# Patient Record
Sex: MALE | URBAN - METROPOLITAN AREA
[De-identification: names, ages, dates, MRNs, and addresses within clinical notes are randomized per-mention and may not be internally consistent; named-entity substitution may affect disease eponyms.]

---

## 2019-07-05 ENCOUNTER — APPOINTMENT (RX ONLY)
Dept: URBAN - METROPOLITAN AREA TELEMEDICINE 2 | Facility: TELEMEDICINE | Age: 44
Setting detail: DERMATOLOGY
End: 2019-07-05

## 2019-07-05 DIAGNOSIS — Z87.891 PERSONAL HISTORY OF NICOTINE DEPENDENCE: ICD-10-CM

## 2019-07-05 DIAGNOSIS — R60.0 LOCALIZED EDEMA: ICD-10-CM

## 2019-07-05 DIAGNOSIS — L08.9 LOCAL INFECTION OF THE SKIN AND SUBCUTANEOUS TISSUE, UNSPECIFIED: ICD-10-CM

## 2019-07-05 DIAGNOSIS — B35.4 TINEA CORPORIS: ICD-10-CM

## 2019-07-05 PROBLEM — F41.9 ANXIETY DISORDER, UNSPECIFIED: Status: ACTIVE | Noted: 2019-07-05

## 2019-07-05 PROBLEM — J45.909 UNSPECIFIED ASTHMA, UNCOMPLICATED: Status: ACTIVE | Noted: 2019-07-05

## 2019-07-05 PROBLEM — F32.9 MAJOR DEPRESSIVE DISORDER, SINGLE EPISODE, UNSPECIFIED: Status: ACTIVE | Noted: 2019-07-05

## 2019-07-05 PROBLEM — E13.9 OTHER SPECIFIED DIABETES MELLITUS WITHOUT COMPLICATIONS: Status: ACTIVE | Noted: 2019-07-05

## 2019-07-05 PROBLEM — G40.909 EPILEPSY, UNSPECIFIED, NOT INTRACTABLE, WITHOUT STATUS EPILEPTICUS: Status: ACTIVE | Noted: 2019-07-05

## 2019-07-05 PROCEDURE — ? PRESCRIPTION

## 2019-07-05 PROCEDURE — ? KOH PREP

## 2019-07-05 PROCEDURE — 87220 TISSUE EXAM FOR FUNGI: CPT

## 2019-07-05 PROCEDURE — 99202 OFFICE O/P NEW SF 15 MIN: CPT

## 2019-07-05 PROCEDURE — ? PATIENT SPECIFIC COUNSELING

## 2019-07-05 PROCEDURE — ? ORDER TESTS

## 2019-07-05 PROCEDURE — ? COUNSELING

## 2019-07-05 RX ORDER — TERBINAFINE HYDROCHLORIDE 250 MG/1
TABLET ORAL
Qty: 14 | Refills: 0 | Status: ERX | COMMUNITY
Start: 2019-07-05

## 2019-07-05 RX ORDER — DOXYCYCLINE HYCLATE 100 MG/1
CAPSULE, GELATIN COATED ORAL BID
Qty: 20 | Refills: 0 | Status: ERX | COMMUNITY
Start: 2019-07-05

## 2019-07-05 RX ORDER — MUPIROCIN 20 MG/G
1 OINTMENT TOPICAL TWICE DAILY
Qty: 2 | Refills: 1 | Status: ERX | COMMUNITY
Start: 2019-07-05

## 2019-07-05 RX ORDER — CICLOPIROX OLAMINE 7.7 MG/G
1 CREAM TOPICAL TWICE DAILY
Qty: 1 | Refills: 1 | Status: ERX | COMMUNITY
Start: 2019-07-05

## 2019-07-05 RX ADMIN — CICLOPIROX OLAMINE 1: 7.7 CREAM TOPICAL at 14:23

## 2019-07-05 RX ADMIN — TERBINAFINE HYDROCHLORIDE: 250 TABLET ORAL at 14:26

## 2019-07-05 RX ADMIN — MUPIROCIN 1: 20 OINTMENT TOPICAL at 14:23

## 2019-07-05 RX ADMIN — DOXYCYCLINE HYCLATE: 100 CAPSULE, GELATIN COATED ORAL at 14:22

## 2019-07-05 ASSESSMENT — LOCATION DETAILED DESCRIPTION DERM: LOCATION DETAILED: RIGHT DISTAL PRETIBIAL REGION

## 2019-07-05 ASSESSMENT — LOCATION ZONE DERM: LOCATION ZONE: LEG

## 2019-07-05 ASSESSMENT — LOCATION SIMPLE DESCRIPTION DERM: LOCATION SIMPLE: RIGHT PRETIBIAL REGION

## 2019-07-05 NOTE — HPI: RASH
What Type Of Note Output Would You Prefer (Optional)?: Bullet Format
How Severe Is Your Rash?: moderate
Is This A New Presentation, Or A Follow-Up?: Rash
Additional History: Patient states he has been given multiple ointments in the past.

## 2019-07-12 ENCOUNTER — APPOINTMENT (RX ONLY)
Dept: URBAN - METROPOLITAN AREA TELEMEDICINE 2 | Facility: TELEMEDICINE | Age: 44
Setting detail: DERMATOLOGY
End: 2019-07-12

## 2019-07-12 DIAGNOSIS — B35.4 TINEA CORPORIS: ICD-10-CM | Status: IMPROVED

## 2019-07-12 DIAGNOSIS — L81.0 POSTINFLAMMATORY HYPERPIGMENTATION: ICD-10-CM

## 2019-07-12 DIAGNOSIS — L08.9 LOCAL INFECTION OF THE SKIN AND SUBCUTANEOUS TISSUE, UNSPECIFIED: ICD-10-CM | Status: RESOLVING

## 2019-07-12 PROCEDURE — ? PATIENT SPECIFIC COUNSELING

## 2019-07-12 PROCEDURE — ? PRESCRIPTION

## 2019-07-12 PROCEDURE — ? TREATMENT REGIMEN

## 2019-07-12 PROCEDURE — 99213 OFFICE O/P EST LOW 20 MIN: CPT

## 2019-07-12 PROCEDURE — ? COUNSELING

## 2019-07-12 RX ORDER — TERBINAFINE HYDROCHLORIDE 250 MG/1
TABLET ORAL QD
Qty: 14 | Refills: 0 | Status: ERX | COMMUNITY
Start: 2019-07-12

## 2019-07-12 RX ADMIN — TERBINAFINE HYDROCHLORIDE: 250 TABLET ORAL at 14:08

## 2019-07-12 ASSESSMENT — LOCATION DETAILED DESCRIPTION DERM
LOCATION DETAILED: RIGHT DISTAL CALF
LOCATION DETAILED: RIGHT DISTAL PRETIBIAL REGION

## 2019-07-12 ASSESSMENT — LOCATION SIMPLE DESCRIPTION DERM
LOCATION SIMPLE: RIGHT CALF
LOCATION SIMPLE: RIGHT PRETIBIAL REGION

## 2019-07-12 ASSESSMENT — LOCATION ZONE DERM: LOCATION ZONE: LEG

## 2019-07-12 ASSESSMENT — SEVERITY ASSESSMENT: SEVERITY: MODERATE

## 2019-07-12 NOTE — PROCEDURE: TREATMENT REGIMEN
Detail Level: Zone
Continue Regimen: Terbinafine for two weeks qd\\nCiclopirox
Plan: Pt has a few days left on doxycycline, pt is to discontinue doxycycline after his pills are gone
Discontinue Regimen: Doxycycline
Continue Regimen: Mupirocin

## 2019-07-26 ENCOUNTER — APPOINTMENT (RX ONLY)
Dept: URBAN - METROPOLITAN AREA TELEMEDICINE 2 | Facility: TELEMEDICINE | Age: 44
Setting detail: DERMATOLOGY
End: 2019-07-26

## 2019-07-26 DIAGNOSIS — B35.4 TINEA CORPORIS: ICD-10-CM

## 2019-07-26 DIAGNOSIS — L08.9 LOCAL INFECTION OF THE SKIN AND SUBCUTANEOUS TISSUE, UNSPECIFIED: ICD-10-CM

## 2019-07-26 DIAGNOSIS — L81.8 OTHER SPECIFIED DISORDERS OF PIGMENTATION: ICD-10-CM

## 2019-07-26 PROCEDURE — 99213 OFFICE O/P EST LOW 20 MIN: CPT | Mod: 25

## 2019-07-26 PROCEDURE — ? BIOPSY BY PUNCH METHOD

## 2019-07-26 PROCEDURE — ? COUNSELING

## 2019-07-26 PROCEDURE — 11104 PUNCH BX SKIN SINGLE LESION: CPT

## 2019-07-26 PROCEDURE — ? OTHER

## 2019-07-26 PROCEDURE — ? PRESCRIPTION

## 2019-07-26 PROCEDURE — ? ORDER TESTS

## 2019-07-26 RX ORDER — TRIAMCINOLONE ACETONIDE 1 MG/G
1 CREAM TOPICAL BID
Qty: 80 | Refills: 0 | Status: ERX | COMMUNITY
Start: 2019-07-26

## 2019-07-26 RX ORDER — CICLOPIROX OLAMINE 7.7 MG/G
1 CREAM TOPICAL TWICE DAILY
Qty: 1 | Refills: 1 | Status: ERX | COMMUNITY
Start: 2019-07-26

## 2019-07-26 RX ADMIN — CICLOPIROX OLAMINE 1: 7.7 CREAM TOPICAL at 14:27

## 2019-07-26 RX ADMIN — TRIAMCINOLONE ACETONIDE 1: 1 CREAM TOPICAL at 14:29

## 2019-07-26 ASSESSMENT — LOCATION DETAILED DESCRIPTION DERM
LOCATION DETAILED: RIGHT MEDIAL DISTAL PRETIBIAL REGION
LOCATION DETAILED: RIGHT DISTAL PRETIBIAL REGION
LOCATION DETAILED: RIGHT DISTAL CALF

## 2019-07-26 ASSESSMENT — LOCATION SIMPLE DESCRIPTION DERM
LOCATION SIMPLE: RIGHT PRETIBIAL REGION
LOCATION SIMPLE: RIGHT CALF

## 2019-07-26 ASSESSMENT — SEVERITY ASSESSMENT: SEVERITY: MODERATE

## 2019-07-26 ASSESSMENT — LOCATION ZONE DERM: LOCATION ZONE: LEG

## 2019-07-26 NOTE — PROCEDURE: OTHER
Detail Level: Generalized
Other (Free Text): -s/p doxycycline
Note Text (......Xxx Chief Complaint.): This diagnosis correlates with the

## 2019-07-26 NOTE — PROCEDURE: BIOPSY BY PUNCH METHOD
Render Post-Care Instructions In Note?: yes
X Depth Of Punch In Cm (Optional): 0
Bill [id number] For Specimen Handling/Conveyance To Laboratory?: no
Anesthesia Volume In Cc: 2
Wound Care: Vaseline
Billing Type: Third-Party Bill
Epidermal Sutures: none, closed by secondary intention
Hemostasis: None
Lab: [id number]
Dressing: bandage
Biopsy Type: H and E
Post-Care Instructions: I reviewed with the patient in detail post-care instructions. Patient is to keep the biopsy site dry overnight, and then apply vaseline twice daily until healed. Patient may apply hydrogen peroxide soaks to remove any crusting.
Anesthesia Type: 1% lidocaine with epinephrine
Lab Facility: [id number]
Home Suture Removal Text: Patient was provided a home suture removal kit and will remove their sutures at home.  If they have any questions or difficulties they will call the office.
Body Location Override (Optional - Billing Will Still Be Based On Selected Body Map Location If Applicable): right distal leg
Consent: Written consent was obtained and risks were reviewed including but not limited to scarring, infection, bleeding, scabbing, incomplete removal, nerve damage and allergy to anesthesia.
Suture Removal: 14 days
Notification Instructions: Patient will be notified of biopsy results. However, patient instructed to call the office if not contacted within 2 weeks.
Punch Size In Mm: 4
Detail Level: Detailed

## 2019-08-08 ENCOUNTER — APPOINTMENT (RX ONLY)
Dept: URBAN - METROPOLITAN AREA TELEMEDICINE 2 | Facility: TELEMEDICINE | Age: 44
Setting detail: DERMATOLOGY
End: 2019-08-08

## 2019-08-08 DIAGNOSIS — B35.4 TINEA CORPORIS: ICD-10-CM | Status: RESOLVED

## 2019-08-08 DIAGNOSIS — L259 CONTACT DERMATITIS AND OTHER ECZEMA, UNSPECIFIED CAUSE: ICD-10-CM | Status: IMPROVED

## 2019-08-08 DIAGNOSIS — L81.0 POSTINFLAMMATORY HYPERPIGMENTATION: ICD-10-CM

## 2019-08-08 PROBLEM — L08.9 LOCAL INFECTION OF THE SKIN AND SUBCUTANEOUS TISSUE, UNSPECIFIED: Status: ACTIVE | Noted: 2019-08-08

## 2019-08-08 PROBLEM — L30.8 OTHER SPECIFIED DERMATITIS: Status: ACTIVE | Noted: 2019-08-08

## 2019-08-08 PROCEDURE — 99213 OFFICE O/P EST LOW 20 MIN: CPT

## 2019-08-08 PROCEDURE — ? COUNSELING

## 2019-08-08 PROCEDURE — ? TREATMENT REGIMEN

## 2019-08-08 ASSESSMENT — LOCATION DETAILED DESCRIPTION DERM
LOCATION DETAILED: RIGHT MEDIAL DISTAL PRETIBIAL REGION
LOCATION DETAILED: RIGHT DISTAL CALF
LOCATION DETAILED: RIGHT DISTAL PRETIBIAL REGION

## 2019-08-08 ASSESSMENT — LOCATION SIMPLE DESCRIPTION DERM
LOCATION SIMPLE: RIGHT CALF
LOCATION SIMPLE: RIGHT PRETIBIAL REGION

## 2019-08-08 ASSESSMENT — SEVERITY ASSESSMENT: SEVERITY: MILD TO MODERATE

## 2019-08-08 ASSESSMENT — LOCATION ZONE DERM: LOCATION ZONE: LEG

## 2019-08-08 NOTE — PROCEDURE: TREATMENT REGIMEN
Continue Regimen: Ciclopirox with tac cream
Detail Level: Zone
Continue Regimen: TAC twice daily prn
Plan: Discussed stronger prescription topical if persists or worsens

## 2019-10-03 ENCOUNTER — APPOINTMENT (RX ONLY)
Dept: URBAN - METROPOLITAN AREA TELEMEDICINE 2 | Facility: TELEMEDICINE | Age: 44
Setting detail: DERMATOLOGY
End: 2019-10-03

## 2019-10-03 DIAGNOSIS — L259 CONTACT DERMATITIS AND OTHER ECZEMA, UNSPECIFIED CAUSE: ICD-10-CM | Status: STABLE

## 2019-10-03 DIAGNOSIS — L81.0 POSTINFLAMMATORY HYPERPIGMENTATION: ICD-10-CM | Status: RESOLVING

## 2019-10-03 PROBLEM — L30.8 OTHER SPECIFIED DERMATITIS: Status: ACTIVE | Noted: 2019-10-03

## 2019-10-03 PROCEDURE — 99213 OFFICE O/P EST LOW 20 MIN: CPT

## 2019-10-03 PROCEDURE — ? PRESCRIPTION

## 2019-10-03 PROCEDURE — ? COUNSELING

## 2019-10-03 PROCEDURE — ? TREATMENT REGIMEN

## 2019-10-03 RX ORDER — TRIAMCINOLONE ACETONIDE 1 MG/G
1 CREAM TOPICAL TWICE DAILY
Qty: 1 | Refills: 1 | Status: ERX | COMMUNITY
Start: 2019-10-03

## 2019-10-03 RX ADMIN — TRIAMCINOLONE ACETONIDE 1: 1 CREAM TOPICAL at 14:14

## 2019-10-03 ASSESSMENT — LOCATION ZONE DERM: LOCATION ZONE: LEG

## 2019-10-03 ASSESSMENT — LOCATION DETAILED DESCRIPTION DERM
LOCATION DETAILED: RIGHT DISTAL PRETIBIAL REGION
LOCATION DETAILED: RIGHT MEDIAL DISTAL PRETIBIAL REGION
LOCATION DETAILED: RIGHT DISTAL CALF

## 2019-10-03 ASSESSMENT — LOCATION SIMPLE DESCRIPTION DERM
LOCATION SIMPLE: RIGHT PRETIBIAL REGION
LOCATION SIMPLE: RIGHT CALF

## 2019-10-03 ASSESSMENT — SEVERITY ASSESSMENT
SEVERITY: ALMOST CLEAR
SEVERITY: MILD